# Patient Record
Sex: FEMALE | Race: BLACK OR AFRICAN AMERICAN | NOT HISPANIC OR LATINO | URBAN - METROPOLITAN AREA
[De-identification: names, ages, dates, MRNs, and addresses within clinical notes are randomized per-mention and may not be internally consistent; named-entity substitution may affect disease eponyms.]

---

## 2020-07-25 ENCOUNTER — TELEPHONE ENCOUNTER (OUTPATIENT)
Dept: URBAN - METROPOLITAN AREA CLINIC 13 | Facility: CLINIC | Age: 65
End: 2020-07-25

## 2020-07-26 ENCOUNTER — TELEPHONE ENCOUNTER (OUTPATIENT)
Dept: URBAN - METROPOLITAN AREA CLINIC 13 | Facility: CLINIC | Age: 65
End: 2020-07-26

## 2020-10-29 ENCOUNTER — TELEPHONE ENCOUNTER (OUTPATIENT)
Dept: URBAN - METROPOLITAN AREA CLINIC 113 | Facility: CLINIC | Age: 65
End: 2020-10-29

## 2021-04-16 ENCOUNTER — OFFICE VISIT (OUTPATIENT)
Dept: URBAN - METROPOLITAN AREA CLINIC 107 | Facility: CLINIC | Age: 66
End: 2021-04-16
Payer: MEDICARE

## 2021-04-16 ENCOUNTER — WEB ENCOUNTER (OUTPATIENT)
Dept: URBAN - METROPOLITAN AREA CLINIC 107 | Facility: CLINIC | Age: 66
End: 2021-04-16

## 2021-04-16 VITALS
TEMPERATURE: 98.5 F | WEIGHT: 123 LBS | HEIGHT: 63 IN | HEART RATE: 68 BPM | SYSTOLIC BLOOD PRESSURE: 120 MMHG | DIASTOLIC BLOOD PRESSURE: 74 MMHG | RESPIRATION RATE: 18 BRPM | BODY MASS INDEX: 21.79 KG/M2

## 2021-04-16 DIAGNOSIS — R11.14 BILIOUS VOMITING WITH NAUSEA: ICD-10-CM

## 2021-04-16 DIAGNOSIS — R10.13 EPIGASTRIC PAIN: ICD-10-CM

## 2021-04-16 PROCEDURE — 99244 OFF/OP CNSLTJ NEW/EST MOD 40: CPT | Performed by: INTERNAL MEDICINE

## 2021-04-16 PROCEDURE — 99204 OFFICE O/P NEW MOD 45 MIN: CPT | Performed by: INTERNAL MEDICINE

## 2021-04-16 RX ORDER — ONDANSETRON HYDROCHLORIDE 4 MG/1
1 TABLET TABLET, FILM COATED ORAL ONCE A DAY
Qty: 90 | Refills: 2 | OUTPATIENT
Start: 2021-04-16

## 2021-04-16 RX ORDER — ATORVASTATIN CALCIUM 80 MG/1
1 TABLET TABLET, FILM COATED ORAL ONCE A DAY
Status: ACTIVE | COMMUNITY

## 2021-04-16 RX ORDER — METFORMIN HYDROCHLORIDE 500 MG/1
1 TABLET WITH A MEAL TABLET, FILM COATED ORAL ONCE A DAY
Status: ACTIVE | COMMUNITY

## 2021-04-16 RX ORDER — OLMESARTAN MEDOXOMIL 40 MG/1
1 TABLET TABLET ORAL ONCE A DAY
Status: ACTIVE | COMMUNITY

## 2021-04-16 RX ORDER — TRAMADOL HYDROCHLORIDE 50 MG/1
1 TABLET AS NEEDED TABLET, FILM COATED ORAL ONCE A DAY
Status: ACTIVE | COMMUNITY

## 2021-04-16 RX ORDER — GLIPIZIDE 10 MG/1
1 TABLET 30 MINUTES BEFORE BREAKFAST TABLET ORAL ONCE A DAY
Status: ACTIVE | COMMUNITY

## 2021-04-16 RX ORDER — METOPROLOL TARTRATE 25 MG/1
1 TABLET WITH FOOD TABLET, FILM COATED ORAL TWICE A DAY
Status: ACTIVE | COMMUNITY

## 2021-04-16 RX ORDER — ASPIRIN 81 MG/1
1 TABLET TABLET, COATED ORAL ONCE A DAY
Status: ACTIVE | COMMUNITY

## 2021-04-16 RX ORDER — ONDANSETRON 4 MG/1
1 TABLET ON THE TONGUE AND ALLOW TO DISSOLVE TABLET, ORALLY DISINTEGRATING ORAL ONCE A DAY
Status: ACTIVE | COMMUNITY

## 2021-04-16 RX ORDER — FAMOTIDINE 40 MG/1
1 TABLET AT BEDTIME TABLET, FILM COATED ORAL ONCE A DAY
Qty: 30 | OUTPATIENT
Start: 2021-04-16

## 2021-04-16 RX ORDER — FAMOTIDINE 40 MG/1
1 TABLET AT BEDTIME TABLET, FILM COATED ORAL ONCE A DAY
Status: ACTIVE | COMMUNITY

## 2021-04-16 RX ORDER — POTASSIUM CHLORIDE 1.5 G/1
1 PACKET WITH FOOD POWDER, FOR SOLUTION ORAL ONCE A DAY
Status: ACTIVE | COMMUNITY

## 2021-04-16 NOTE — HPI-OTHER HISTORIES
3/31/2021 CT of the abdomen and pelvis without contrast 3/31/2021:No inflammatory changes or free fluid.  Mild constipation.  No mass or adenopathy.  Normal appendix.  There is an annular disc bulge at L4-L5 with grade 1 anterior listhesis causing moderate central canal stenosis. Labs 3/31/2021:BMP normal.  LFTs normal TB 0.7, ALP 73, ALT 25, AST 29.  Lactic acid 0.9.  Total creatinine kinase 172.  CRP less than 0.4.  Lipase 39.  TSH 1.690.  CBC: WBC 6.53, hemoglobin 12.7, MCV 82.3, platelet 204.  ESR 40.  Urinalysis normal with exception of trace protein and small leukocyte Estrace.

## 2021-04-16 NOTE — HPI-TODAY'S VISIT:
The patient was referred by Dr. Perry for abdominal pain.   A copy of this document is being forwarded to the referring provider.  This is a 65-year-old female with a history of diabetes and hypertension presenting for evaluation of vomiting.  She presented to the emergency department at [facility] on nausea, vomiting, and weight loss.    She was previously a patient of Dr. Altamirano. She was last seen about 2 years ago. She states she has had an EGD/CSC in the past but is unsure when it was.   She states that she has had abdominal pain "her entire life". Her pains are associated with epigastric discomfort and burping. She states it does feel like reflux/regurgitation. She does have nausea/emesis as well. This typically occurs several times per month without association of food/activity.

## 2021-05-12 ENCOUNTER — CLAIMS CREATED FROM THE CLAIM WINDOW (OUTPATIENT)
Dept: URBAN - METROPOLITAN AREA CLINIC 4 | Facility: CLINIC | Age: 66
End: 2021-05-12
Payer: MEDICARE

## 2021-05-12 ENCOUNTER — OFFICE VISIT (OUTPATIENT)
Dept: URBAN - METROPOLITAN AREA SURGERY CENTER 25 | Facility: SURGERY CENTER | Age: 66
End: 2021-05-12
Payer: MEDICARE

## 2021-05-12 DIAGNOSIS — K31.89 ACQUIRED DEFORMITY OF DUODENUM: ICD-10-CM

## 2021-05-12 DIAGNOSIS — K31.89 GASTRIC PERFORATION WITHOUT ULCER: ICD-10-CM

## 2021-05-12 DIAGNOSIS — R10.13 EPIGASTRIC ABDOMINAL PAIN: ICD-10-CM

## 2021-05-12 PROCEDURE — 88305 TISSUE EXAM BY PATHOLOGIST: CPT | Performed by: PATHOLOGY

## 2021-05-12 PROCEDURE — 43239 EGD BIOPSY SINGLE/MULTIPLE: CPT | Performed by: INTERNAL MEDICINE

## 2021-05-12 PROCEDURE — 88312 SPECIAL STAINS GROUP 1: CPT | Performed by: PATHOLOGY

## 2021-05-12 PROCEDURE — G8907 PT DOC NO EVENTS ON DISCHARG: HCPCS | Performed by: INTERNAL MEDICINE

## 2021-05-12 RX ORDER — TRAMADOL HYDROCHLORIDE 50 MG/1
1 TABLET AS NEEDED TABLET, FILM COATED ORAL ONCE A DAY
Status: ACTIVE | COMMUNITY

## 2021-05-12 RX ORDER — GLIPIZIDE 10 MG/1
1 TABLET 30 MINUTES BEFORE BREAKFAST TABLET ORAL ONCE A DAY
Status: ACTIVE | COMMUNITY

## 2021-05-12 RX ORDER — ONDANSETRON HYDROCHLORIDE 4 MG/1
1 TABLET TABLET, FILM COATED ORAL ONCE A DAY
Qty: 90 | Refills: 2 | Status: ACTIVE | COMMUNITY
Start: 2021-04-16

## 2021-05-12 RX ORDER — ASPIRIN 81 MG/1
1 TABLET TABLET, COATED ORAL ONCE A DAY
Status: ACTIVE | COMMUNITY

## 2021-05-12 RX ORDER — FAMOTIDINE 40 MG/1
1 TABLET AT BEDTIME TABLET, FILM COATED ORAL ONCE A DAY
Status: ACTIVE | COMMUNITY

## 2021-05-12 RX ORDER — FAMOTIDINE 40 MG/1
1 TABLET AT BEDTIME TABLET, FILM COATED ORAL ONCE A DAY
Qty: 30 | Status: ACTIVE | COMMUNITY
Start: 2021-04-16

## 2021-05-12 RX ORDER — ONDANSETRON 4 MG/1
1 TABLET ON THE TONGUE AND ALLOW TO DISSOLVE TABLET, ORALLY DISINTEGRATING ORAL ONCE A DAY
Status: ACTIVE | COMMUNITY

## 2021-05-12 RX ORDER — METFORMIN HYDROCHLORIDE 500 MG/1
1 TABLET WITH A MEAL TABLET, FILM COATED ORAL ONCE A DAY
Status: ACTIVE | COMMUNITY

## 2021-05-12 RX ORDER — OLMESARTAN MEDOXOMIL 40 MG/1
1 TABLET TABLET ORAL ONCE A DAY
Status: ACTIVE | COMMUNITY

## 2021-05-12 RX ORDER — METOPROLOL TARTRATE 25 MG/1
1 TABLET WITH FOOD TABLET, FILM COATED ORAL TWICE A DAY
Status: ACTIVE | COMMUNITY

## 2021-05-12 RX ORDER — ATORVASTATIN CALCIUM 80 MG/1
1 TABLET TABLET, FILM COATED ORAL ONCE A DAY
Status: ACTIVE | COMMUNITY

## 2021-05-12 RX ORDER — POTASSIUM CHLORIDE 1.5 G/1
1 PACKET WITH FOOD POWDER, FOR SOLUTION ORAL ONCE A DAY
Status: ACTIVE | COMMUNITY

## 2021-05-28 ENCOUNTER — WEB ENCOUNTER (OUTPATIENT)
Dept: URBAN - METROPOLITAN AREA CLINIC 107 | Facility: CLINIC | Age: 66
End: 2021-05-28

## 2021-05-28 ENCOUNTER — TELEPHONE ENCOUNTER (OUTPATIENT)
Dept: URBAN - METROPOLITAN AREA CLINIC 113 | Facility: CLINIC | Age: 66
End: 2021-05-28

## 2021-05-28 ENCOUNTER — OFFICE VISIT (OUTPATIENT)
Dept: URBAN - METROPOLITAN AREA CLINIC 107 | Facility: CLINIC | Age: 66
End: 2021-05-28
Payer: MEDICARE

## 2021-05-28 VITALS
HEIGHT: 63 IN | SYSTOLIC BLOOD PRESSURE: 193 MMHG | WEIGHT: 119 LBS | RESPIRATION RATE: 18 BRPM | DIASTOLIC BLOOD PRESSURE: 90 MMHG | HEART RATE: 89 BPM | TEMPERATURE: 98.3 F | BODY MASS INDEX: 21.09 KG/M2

## 2021-05-28 DIAGNOSIS — R11.14 BILIOUS VOMITING WITH NAUSEA: ICD-10-CM

## 2021-05-28 DIAGNOSIS — R10.13 EPIGASTRIC PAIN: ICD-10-CM

## 2021-05-28 PROCEDURE — 99213 OFFICE O/P EST LOW 20 MIN: CPT | Performed by: INTERNAL MEDICINE

## 2021-05-28 RX ORDER — ATORVASTATIN CALCIUM 80 MG/1
1 TABLET TABLET, FILM COATED ORAL ONCE A DAY
Status: ACTIVE | COMMUNITY

## 2021-05-28 RX ORDER — TRAMADOL HYDROCHLORIDE 50 MG/1
1 TABLET AS NEEDED TABLET, FILM COATED ORAL ONCE A DAY
Status: ACTIVE | COMMUNITY

## 2021-05-28 RX ORDER — FAMOTIDINE 40 MG/1
1 TABLET AT BEDTIME TABLET, FILM COATED ORAL ONCE A DAY
Status: ACTIVE | COMMUNITY

## 2021-05-28 RX ORDER — METOPROLOL TARTRATE 25 MG/1
1 TABLET WITH FOOD TABLET, FILM COATED ORAL TWICE A DAY
Status: ACTIVE | COMMUNITY

## 2021-05-28 RX ORDER — LANSOPRAZOLE 30 MG
1 CAPSULE BEFORE A MEAL CAPSULE,DELAYED RELEASE (ENTERIC COATED) ORAL ONCE A DAY
Qty: 30 | Refills: 6

## 2021-05-28 RX ORDER — OLMESARTAN MEDOXOMIL 40 MG/1
1 TABLET TABLET ORAL ONCE A DAY
Status: ACTIVE | COMMUNITY

## 2021-05-28 RX ORDER — ONDANSETRON HYDROCHLORIDE 4 MG/1
1 TABLET TABLET, FILM COATED ORAL ONCE A DAY
Qty: 90 | Refills: 2 | Status: ACTIVE | COMMUNITY
Start: 2021-04-16

## 2021-05-28 RX ORDER — FAMOTIDINE 40 MG/1
1 TABLET AT BEDTIME TABLET, FILM COATED ORAL ONCE A DAY
Qty: 180 | Refills: 3

## 2021-05-28 RX ORDER — ONDANSETRON 4 MG/1
1 TABLET ON THE TONGUE AND ALLOW TO DISSOLVE TABLET, ORALLY DISINTEGRATING ORAL ONCE A DAY
Qty: 90 | Refills: 3

## 2021-05-28 RX ORDER — POTASSIUM CHLORIDE 1.5 G/1
1 PACKET WITH FOOD POWDER, FOR SOLUTION ORAL ONCE A DAY
Status: ACTIVE | COMMUNITY

## 2021-05-28 RX ORDER — ASPIRIN 81 MG/1
1 TABLET TABLET, COATED ORAL ONCE A DAY
Status: ACTIVE | COMMUNITY

## 2021-05-28 RX ORDER — METFORMIN HYDROCHLORIDE 500 MG/1
1 TABLET WITH A MEAL TABLET, FILM COATED ORAL ONCE A DAY
Status: ACTIVE | COMMUNITY

## 2021-05-28 RX ORDER — ONDANSETRON 4 MG/1
1 TABLET ON THE TONGUE AND ALLOW TO DISSOLVE TABLET, ORALLY DISINTEGRATING ORAL ONCE A DAY
Status: ACTIVE | COMMUNITY

## 2021-05-28 RX ORDER — LANSOPRAZOLE 30 MG
1 CAPSULE BEFORE A MEAL CAPSULE,DELAYED RELEASE (ENTERIC COATED) ORAL ONCE A DAY
Qty: 30 | OUTPATIENT
Start: 2021-05-28

## 2021-05-28 RX ORDER — FAMOTIDINE 40 MG/1
1 TABLET AT BEDTIME TABLET, FILM COATED ORAL ONCE A DAY
Qty: 30 | Status: ACTIVE | COMMUNITY
Start: 2021-04-16

## 2021-05-28 RX ORDER — GLIPIZIDE 10 MG/1
1 TABLET 30 MINUTES BEFORE BREAKFAST TABLET ORAL ONCE A DAY
Status: ACTIVE | COMMUNITY

## 2021-05-28 NOTE — HPI-TODAY'S VISIT:
The patient was referred by Dr. Perry for abdominal pain.   Although she is an established patient of [facility], she was seen by Dr. Ibarra at the time of her initial consultation on April 16, 2021.    This is a 65-year-old female with a history of diabetes and hypertension presenting for evaluation of vomiting.  She presented to the emergency department at [facility] on nausea, vomiting, and weight loss.    She was previously a patient of Dr. Altamirano. She was last seen about 2 years ago. She states she has had an EGD/CSC in the past but is unsure when it was.   She states that she has had abdominal pain "her entire life". Her pains are associated with epigastric discomfort and burping. She states it does feel like reflux/regurgitation. She does have nausea/emesis as well. This typically occurs several times per month without association of food/activity.   The patient was placed on famotidine and Zofran and was scheduled for upper GI endoscopy, which was performed on May 12, 2021. This examination revealed gastritis but was otherwise negative.  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   The patient still has epigastric pain, although as long she takes Prevacid, famotidine and ondansetron, she is asymptomatic.  She has run out of these medications recently, and her symptoms have gradually been returning.

## 2021-06-24 ENCOUNTER — ERX REFILL RESPONSE (OUTPATIENT)
Dept: URBAN - METROPOLITAN AREA CLINIC 107 | Facility: CLINIC | Age: 66
End: 2021-06-24

## 2021-06-24 RX ORDER — LANSOPRAZOLE 30 MG/1
1 CAPSULE BEFORE A MEAL CAPSULE, DELAYED RELEASE ORAL ONCE A DAY
Qty: 30 | Refills: 0

## 2021-07-15 ENCOUNTER — OFFICE VISIT (OUTPATIENT)
Dept: URBAN - METROPOLITAN AREA CLINIC 107 | Facility: CLINIC | Age: 66
End: 2021-07-15

## 2021-07-15 ENCOUNTER — TELEPHONE ENCOUNTER (OUTPATIENT)
Dept: URBAN - METROPOLITAN AREA CLINIC 113 | Facility: CLINIC | Age: 66
End: 2021-07-15

## 2021-07-15 RX ORDER — ATORVASTATIN CALCIUM 80 MG/1
1 TABLET TABLET, FILM COATED ORAL ONCE A DAY
Status: ACTIVE | COMMUNITY

## 2021-07-15 RX ORDER — POTASSIUM CHLORIDE 1.5 G/1
1 PACKET WITH FOOD POWDER, FOR SOLUTION ORAL ONCE A DAY
Status: ACTIVE | COMMUNITY

## 2021-07-15 RX ORDER — FAMOTIDINE 40 MG/1
1 TABLET AT BEDTIME TABLET, FILM COATED ORAL ONCE A DAY
Qty: 30 | Status: ACTIVE | COMMUNITY
Start: 2021-04-16

## 2021-07-15 RX ORDER — LANSOPRAZOLE 30 MG/1
1 CAPSULE BEFORE A MEAL CAPSULE, DELAYED RELEASE ORAL ONCE A DAY
Qty: 30 | Refills: 0 | Status: ACTIVE | COMMUNITY

## 2021-07-15 RX ORDER — GLIPIZIDE 10 MG/1
1 TABLET 30 MINUTES BEFORE BREAKFAST TABLET ORAL ONCE A DAY
Status: ACTIVE | COMMUNITY

## 2021-07-15 RX ORDER — FAMOTIDINE 40 MG/1
1 TABLET AT BEDTIME TABLET, FILM COATED ORAL ONCE A DAY
Qty: 180 | Refills: 3 | Status: ACTIVE | COMMUNITY

## 2021-07-15 RX ORDER — ASPIRIN 81 MG/1
1 TABLET TABLET, COATED ORAL ONCE A DAY
Status: ACTIVE | COMMUNITY

## 2021-07-15 RX ORDER — ONDANSETRON HYDROCHLORIDE 4 MG/1
1 TABLET TABLET, FILM COATED ORAL ONCE A DAY
Qty: 90 | Refills: 2 | Status: ACTIVE | COMMUNITY
Start: 2021-04-16

## 2021-07-15 RX ORDER — TRAMADOL HYDROCHLORIDE 50 MG/1
1 TABLET AS NEEDED TABLET, FILM COATED ORAL ONCE A DAY
Status: ACTIVE | COMMUNITY

## 2021-07-15 RX ORDER — OLMESARTAN MEDOXOMIL 40 MG/1
1 TABLET TABLET ORAL ONCE A DAY
Status: ACTIVE | COMMUNITY

## 2021-07-15 RX ORDER — METFORMIN HYDROCHLORIDE 500 MG/1
1 TABLET WITH A MEAL TABLET, FILM COATED ORAL ONCE A DAY
Status: ACTIVE | COMMUNITY

## 2021-07-15 RX ORDER — ONDANSETRON 4 MG/1
1 TABLET ON THE TONGUE AND ALLOW TO DISSOLVE TABLET, ORALLY DISINTEGRATING ORAL ONCE A DAY
Qty: 90 | Refills: 3 | Status: ACTIVE | COMMUNITY

## 2021-07-15 RX ORDER — METOPROLOL TARTRATE 25 MG/1
1 TABLET WITH FOOD TABLET, FILM COATED ORAL TWICE A DAY
Status: ACTIVE | COMMUNITY

## 2021-07-15 NOTE — HPI-TODAY'S VISIT:
This 66-year-old female with a history of diabetes and hypertension was initially referred here by Dr. Perry for evaluation of vomiting and abdominal pain.  She had recently presented to the emergency department at [facility] on 3/31/21 with c/o nausea, vomiting, and weight loss.    At the time of her April 16, 2021 evaluation by Dr. Ibarra, she stated that she has had abdominal pain "her entire life". Her pains are associated with epigastric discomfort and burping. She states it does feel like reflux/regurgitation. She does have nausea/emesis as well. This typically occurs several times per month without association of food/activity.   The patient was placed on famotidine and Zofran and was scheduled for upper GI endoscopy, which was performed on May 12, 2021. This examination revealed gastritis but was otherwise negative.  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   The patient still had epigastric pain at the time of her last visit here, although as long she takes Prevacid, famotidine and ondansetron, she is asymptomatic.  She had run out of these medications Prior to her last visit on May 28, and her symptoms had gradually been returning. We elected to refill the Prevacid, Pepcid AC and Zofran at the time of her last visit.   Review of her prior records reveals that I have seen her since 1996.  She had an upper endoscopy in 1996 showed antral gastritis small hernia.  Similar findings upper endoscopy in 2001.  She had a flexible sigmoidoscopy in 1996 because of complaints of rectal bleeding.  Normal.  She was screened in 2016 for possible colonoscopy, and at that point stated that she never had a colonoscopic examination.  However, this procedure was never scheduled.  It is apparent that she has never had a full colonoscopy.

## 2021-08-04 ENCOUNTER — ERX REFILL RESPONSE (OUTPATIENT)
Dept: URBAN - METROPOLITAN AREA CLINIC 107 | Facility: CLINIC | Age: 66
End: 2021-08-04

## 2021-08-04 RX ORDER — LANSOPRAZOLE 30 MG/1
TAKE 1 CAPSULE BEFORE A MEAL ONCE A DAY CAPSULE, DELAYED RELEASE ORAL
Qty: 30 CAPSULE | Refills: 1 | OUTPATIENT

## 2021-08-04 RX ORDER — LANSOPRAZOLE 30 MG/1
1 CAPSULE BEFORE A MEAL CAPSULE, DELAYED RELEASE ORAL ONCE A DAY
Qty: 30 | Refills: 0 | OUTPATIENT

## 2021-08-31 ENCOUNTER — ERX REFILL RESPONSE (OUTPATIENT)
Dept: URBAN - METROPOLITAN AREA CLINIC 107 | Facility: CLINIC | Age: 66
End: 2021-08-31

## 2021-08-31 RX ORDER — LANSOPRAZOLE 30 MG/1
TAKE 1 CAPSULE BEFORE A MEAL ONCE A DAY CAPSULE, DELAYED RELEASE ORAL
Qty: 30 CAPSULE | Refills: 1 | OUTPATIENT

## 2021-08-31 RX ORDER — LANSOPRAZOLE 30 MG/1
TAKE 1 CAPSULE BY MOUTH BEFORE A MEAL ONCE A DAY CAPSULE, DELAYED RELEASE ORAL
Qty: 30 CAPSULE | Refills: 1 | OUTPATIENT

## 2021-09-28 ENCOUNTER — ERX REFILL RESPONSE (OUTPATIENT)
Dept: URBAN - METROPOLITAN AREA CLINIC 107 | Facility: CLINIC | Age: 66
End: 2021-09-28

## 2021-09-28 RX ORDER — LANSOPRAZOLE 30 MG/1
TAKE 1 CAPSULE BY MOUTH BEFORE A MEAL ONCE A DAY 30 CAPSULE, DELAYED RELEASE ORAL
Qty: 30 CAPSULE | Refills: 1 | OUTPATIENT

## 2021-09-28 RX ORDER — LANSOPRAZOLE 30 MG/1
TAKE 1 CAPSULE BY MOUTH BEFORE A MEAL ONCE A DAY CAPSULE, DELAYED RELEASE ORAL
Qty: 30 CAPSULE | Refills: 1 | OUTPATIENT

## 2021-10-25 ENCOUNTER — ERX REFILL RESPONSE (OUTPATIENT)
Dept: URBAN - METROPOLITAN AREA CLINIC 107 | Facility: CLINIC | Age: 66
End: 2021-10-25

## 2021-10-25 RX ORDER — LANSOPRAZOLE 30 MG/1
TAKE 1 CAPSULE BY MOUTH BEFORE A MEAL ONCE A DAY 30 CAPSULE, DELAYED RELEASE ORAL
Qty: 30 CAPSULE | Refills: 1 | OUTPATIENT

## 2021-11-16 ENCOUNTER — ERX REFILL RESPONSE (OUTPATIENT)
Dept: URBAN - METROPOLITAN AREA CLINIC 107 | Facility: CLINIC | Age: 66
End: 2021-11-16

## 2021-11-16 RX ORDER — LANSOPRAZOLE 30 MG/1
TAKE 1 CAPSULE BY MOUTH BEFORE A MEAL ONCE A DAY 30 CAPSULE, DELAYED RELEASE ORAL
Qty: 30 CAPSULE | Refills: 1 | OUTPATIENT

## 2021-11-16 RX ORDER — LANSOPRAZOLE 30 MG/1
TAKE 1 CAPSULE BY MOUTH BEFORE A MEAL ONCE A DAY CAPSULE, DELAYED RELEASE ORAL
Qty: 30 CAPSULE | Refills: 1 | OUTPATIENT

## 2021-11-17 ENCOUNTER — OFFICE VISIT (OUTPATIENT)
Dept: URBAN - METROPOLITAN AREA CLINIC 113 | Facility: CLINIC | Age: 66
End: 2021-11-17
Payer: MEDICARE

## 2021-11-17 ENCOUNTER — DASHBOARD ENCOUNTERS (OUTPATIENT)
Age: 66
End: 2021-11-17

## 2021-11-17 VITALS
TEMPERATURE: 97.3 F | HEART RATE: 106 BPM | RESPIRATION RATE: 20 BRPM | DIASTOLIC BLOOD PRESSURE: 99 MMHG | HEIGHT: 63 IN | BODY MASS INDEX: 22.86 KG/M2 | SYSTOLIC BLOOD PRESSURE: 213 MMHG | WEIGHT: 129 LBS

## 2021-11-17 DIAGNOSIS — R11.14 BILIOUS VOMITING WITH NAUSEA: ICD-10-CM

## 2021-11-17 DIAGNOSIS — R10.13 EPIGASTRIC PAIN: ICD-10-CM

## 2021-11-17 DIAGNOSIS — Z12.11 SCREEN FOR COLON CANCER: ICD-10-CM

## 2021-11-17 PROCEDURE — 99214 OFFICE O/P EST MOD 30 MIN: CPT | Performed by: INTERNAL MEDICINE

## 2021-11-17 RX ORDER — LANSOPRAZOLE 30 MG
1 CAPSULE BEFORE A MEAL CAPSULE,DELAYED RELEASE (ENTERIC COATED) ORAL ONCE A DAY
Qty: 30 | Refills: 6

## 2021-11-17 RX ORDER — ATORVASTATIN CALCIUM 80 MG/1
1 TABLET TABLET, FILM COATED ORAL ONCE A DAY
Status: ACTIVE | COMMUNITY

## 2021-11-17 RX ORDER — LANSOPRAZOLE 30 MG/1
TAKE 1 CAPSULE BY MOUTH BEFORE A MEAL ONCE A DAY CAPSULE, DELAYED RELEASE ORAL
Qty: 30 CAPSULE | Refills: 1 | Status: ACTIVE | COMMUNITY

## 2021-11-17 RX ORDER — METFORMIN HYDROCHLORIDE 500 MG/1
1 TABLET WITH A MEAL TABLET, FILM COATED ORAL ONCE A DAY
Status: ACTIVE | COMMUNITY

## 2021-11-17 RX ORDER — POTASSIUM CHLORIDE 1.5 G/1
1 PACKET WITH FOOD POWDER, FOR SOLUTION ORAL ONCE A DAY
Status: ACTIVE | COMMUNITY

## 2021-11-17 RX ORDER — FAMOTIDINE 40 MG/1
1 TABLET AT BEDTIME TABLET, FILM COATED ORAL ONCE A DAY
Qty: 180 | Refills: 3 | Status: ACTIVE | COMMUNITY

## 2021-11-17 RX ORDER — ONDANSETRON 4 MG/1
1 TABLET ON THE TONGUE AND ALLOW TO DISSOLVE TABLET, ORALLY DISINTEGRATING ORAL ONCE A DAY
Qty: 90 | Refills: 3

## 2021-11-17 RX ORDER — GLIPIZIDE 10 MG/1
1 TABLET 30 MINUTES BEFORE BREAKFAST TABLET ORAL ONCE A DAY
Status: ACTIVE | COMMUNITY

## 2021-11-17 RX ORDER — FAMOTIDINE 40 MG/1
1 TABLET AT BEDTIME TABLET, FILM COATED ORAL ONCE A DAY
Qty: 30 | Status: ACTIVE | COMMUNITY
Start: 2021-04-16

## 2021-11-17 RX ORDER — METOPROLOL TARTRATE 25 MG/1
1 TABLET WITH FOOD TABLET, FILM COATED ORAL TWICE A DAY
Status: ACTIVE | COMMUNITY

## 2021-11-17 RX ORDER — OLMESARTAN MEDOXOMIL 40 MG/1
1 TABLET TABLET ORAL ONCE A DAY
Status: ACTIVE | COMMUNITY

## 2021-11-17 RX ORDER — FAMOTIDINE 40 MG/1
1 TABLET AT BEDTIME TABLET, FILM COATED ORAL ONCE A DAY
Qty: 180 | Refills: 3

## 2021-11-17 RX ORDER — TRAMADOL HYDROCHLORIDE 50 MG/1
1 TABLET AS NEEDED TABLET, FILM COATED ORAL ONCE A DAY
Status: ACTIVE | COMMUNITY

## 2021-11-17 RX ORDER — ONDANSETRON HYDROCHLORIDE 4 MG/1
1 TABLET TABLET, FILM COATED ORAL ONCE A DAY
Qty: 90 | Refills: 2 | Status: ACTIVE | COMMUNITY
Start: 2021-04-16

## 2021-11-17 RX ORDER — ONDANSETRON 4 MG/1
1 TABLET ON THE TONGUE AND ALLOW TO DISSOLVE TABLET, ORALLY DISINTEGRATING ORAL ONCE A DAY
Qty: 90 | Refills: 3 | Status: ACTIVE | COMMUNITY

## 2021-11-17 RX ORDER — ASPIRIN 81 MG/1
1 TABLET TABLET, COATED ORAL ONCE A DAY
Status: ACTIVE | COMMUNITY

## 2021-11-17 NOTE — HPI-TODAY'S VISIT:
This 66-year-old female with a history of diabetes and hypertension was initially referred here by Dr. Perry for evaluation of vomiting and abdominal pain.  She had recently presented to the emergency department at [facility] on 3/31/21 with c/o nausea, vomiting, and weight loss.    At the time of her April 16, 2021 evaluation by Dr. Ibarra, she stated that she has had abdominal pain "her entire life". Her pains are associated with epigastric discomfort and burping. She states it does feel like reflux/regurgitation. She does have nausea/emesis as well. This typically occurs several times per month without association of food/activity.   The patient was placed on famotidine and Zofran and was scheduled for upper GI endoscopy, which was performed on May 12, 2021. This examination revealed gastritis but was otherwise negative.  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   The patient still had epigastric pain at the time of her last visit here, although as long she takes Prevacid, famotidine and ondansetron, she is asymptomatic.  She had run out of these medications Prior to her last visit on May 28, and her symptoms had gradually been returning. We elected to refill the Prevacid, Pepcid AC and Zofran at the time of her last visit.   Review of her prior records reveals that I have seen her since 1996.  She had an upper endoscopy in 1996 showed antral gastritis small hernia.  Similar findings upper endoscopy in 2001.  She had a flexible sigmoidoscopy in 1996 because of complaints of rectal bleeding.  Normal.  She was screened in 2016 for possible colonoscopy, and at that point stated that she never had a colonoscopic examination.  However, this procedure was never scheduled.  It is apparent that she has never had a full colonoscopy.   The patient is doing extremely well on famotidine and ondansetron.  She is having no nausea or vomiting, no heartburn, and no chest pain or abdominal pain.  We discussed the prospect of colonoscopy, but she does not wish to have this attempted once again at the present time.  She is willing to undergo a fecal immunohistochemical test, and this will be scheduled.  She has no new complaints today.

## 2021-11-21 PROBLEM — 79922009: Status: ACTIVE | Noted: 2021-04-16

## 2021-11-21 PROBLEM — 71419002: Status: ACTIVE | Noted: 2021-04-16

## 2023-01-30 ENCOUNTER — ERX REFILL RESPONSE (OUTPATIENT)
Dept: URBAN - METROPOLITAN AREA CLINIC 113 | Facility: CLINIC | Age: 68
End: 2023-01-30

## 2023-01-30 RX ORDER — FAMOTIDINE 40 MG/1
1 TABLET AT BEDTIME TABLET, FILM COATED ORAL ONCE A DAY
Qty: 180 | Refills: 3 | OUTPATIENT

## 2023-01-30 RX ORDER — FAMOTIDINE 40 MG/1
TAKE 1 TABLET BY MOUTH EVERY DAY AT BEDTIME TABLET, FILM COATED ORAL
Qty: 90 TABLET | Refills: 7 | OUTPATIENT

## 2023-07-12 ENCOUNTER — ERX REFILL RESPONSE (OUTPATIENT)
Dept: URBAN - METROPOLITAN AREA CLINIC 113 | Facility: CLINIC | Age: 68
End: 2023-07-12

## 2023-07-12 RX ORDER — ONDANSETRON 4 MG/1
DISSOLVE 1 TABLET ON TONGUE BY MOUTH EVERY DAY FOR 90 DAYS TABLET, ORALLY DISINTEGRATING ORAL
Qty: 90 TABLET | Refills: 4 | OUTPATIENT

## 2023-07-12 RX ORDER — ONDANSETRON 4 MG/1
DISSOLVE 1 TABLET ON TONGUE BY MOUTH EVERY DAY FOR 90 DAYS TABLET, ORALLY DISINTEGRATING ORAL
Qty: 90 TABLET | Refills: 3 | OUTPATIENT

## 2024-06-02 ENCOUNTER — ERX REFILL RESPONSE (OUTPATIENT)
Dept: URBAN - METROPOLITAN AREA CLINIC 113 | Facility: CLINIC | Age: 69
End: 2024-06-02

## 2024-06-02 RX ORDER — FAMOTIDINE 40 MG/1
TAKE 1 TABLET BY MOUTH EVERY DAY AT BEDTIME TABLET, FILM COATED ORAL
Qty: 90 TABLET | Refills: 7 | OUTPATIENT

## 2025-01-16 ENCOUNTER — ERX REFILL RESPONSE (OUTPATIENT)
Dept: URBAN - METROPOLITAN AREA CLINIC 113 | Facility: CLINIC | Age: 70
End: 2025-01-16

## 2025-01-16 RX ORDER — ONDANSETRON 4 MG/1
DISSOLVE 1 TABLET ON TONGUE BY MOUTH EVERY DAY FOR 90 DAYS TABLET, ORALLY DISINTEGRATING ORAL
Qty: 90 TABLET | Refills: 3 | OUTPATIENT

## 2025-01-16 RX ORDER — ONDANSETRON 4 MG/1
DISSOLVE 1 TABLET ON TONGUE BY MOUTH EVERY DAY FOR 90 DAYS TABLET, ORALLY DISINTEGRATING ORAL
Qty: 90 TABLET | Refills: 4 | OUTPATIENT

## 2025-06-12 ENCOUNTER — ERX REFILL RESPONSE (OUTPATIENT)
Dept: URBAN - METROPOLITAN AREA CLINIC 113 | Facility: CLINIC | Age: 70
End: 2025-06-12

## 2025-06-12 RX ORDER — FAMOTIDINE 40 MG/1
TAKE 1 TABLET BY MOUTH EVERYDAY AT BEDTIME TABLET, FILM COATED ORAL
Qty: 90 TABLET | Refills: 7